# Patient Record
Sex: FEMALE | Race: BLACK OR AFRICAN AMERICAN | Employment: FULL TIME | URBAN - METROPOLITAN AREA
[De-identification: names, ages, dates, MRNs, and addresses within clinical notes are randomized per-mention and may not be internally consistent; named-entity substitution may affect disease eponyms.]

---

## 2021-04-19 ENCOUNTER — HOSPITAL ENCOUNTER (OUTPATIENT)
Age: 26
Discharge: HOME | End: 2021-04-19

## 2021-04-19 VITALS
RESPIRATION RATE: 18 BRPM | HEIGHT: 66 IN | WEIGHT: 165 LBS | OXYGEN SATURATION: 98 % | HEART RATE: 77 BPM | SYSTOLIC BLOOD PRESSURE: 136 MMHG | DIASTOLIC BLOOD PRESSURE: 58 MMHG | TEMPERATURE: 98 F | BODY MASS INDEX: 26.52 KG/M2

## 2021-04-19 DIAGNOSIS — B02.7 DISSEMINATED HERPES ZOSTER: Primary | ICD-10-CM

## 2021-04-19 PROCEDURE — 99203 OFFICE O/P NEW LOW 30 MIN: CPT | Performed by: NURSE PRACTITIONER

## 2021-04-19 RX ORDER — HYDROCODONE BITARTRATE AND ACETAMINOPHEN 5; 325 MG/1; MG/1
1-2 TABLET ORAL EVERY 6 HOURS PRN
Qty: 20 TABLET | Refills: 0 | Status: SHIPPED | OUTPATIENT
Start: 2021-04-19 | End: 2021-04-26

## 2021-04-19 RX ORDER — PREDNISONE 20 MG/1
TABLET ORAL
Qty: 21 TABLET | Refills: 0 | Status: SHIPPED | OUTPATIENT
Start: 2021-04-19 | End: 2021-05-01

## 2021-04-19 RX ORDER — VALACYCLOVIR HYDROCHLORIDE 1 G/1
1 TABLET, FILM COATED ORAL 3 TIMES DAILY
Qty: 21 TABLET | Refills: 0 | Status: SHIPPED | OUTPATIENT
Start: 2021-04-19 | End: 2021-04-26

## 2021-04-19 RX ORDER — IBUPROFEN 600 MG/1
600 TABLET ORAL EVERY 8 HOURS PRN
Qty: 30 TABLET | Refills: 0 | Status: SHIPPED | OUTPATIENT
Start: 2021-04-19 | End: 2021-04-26

## 2021-04-19 NOTE — ED PROVIDER NOTES
Patient Seen in: Immediate [address] [place] [address]      History   Patient presents with:  Rash Skin Problem: Shingles    Stated Complaint: rash on right side     HPI/Subjective:    This is a 17-year-old FTM presents to the immediate care with a rash to the HENT:      Head: Normocephalic. Cardiovascular:      Rate and Rhythm: Normal rate. Pulmonary:      Effort: Pulmonary effort is normal.   Musculoskeletal:         General: Normal range of motion. Skin:     General: Skin is warm and dry.       Capillary HYDROcodone-acetaminophen 5-325 MG Oral Tab  Take 1-2 tablets by mouth every 6 (six) hours as needed for Pain., Normal, Disp-20 tablet, R-0

## 2021-04-19 NOTE — ED INITIAL ASSESSMENT (HPI)
Pain w rash to right side of hip, waist & back started approx   3 days ago,  Minimal relief w benadryl lotion.

## (undated) NOTE — LETTER
Date & Time: 4/19/2021, 1:54 PM  Patient: Dinora Chirinos  Encounter Provider(s):    BRODERICK Armando       To Whom It May Concern:    Dinora Chirinos was seen and treated in our department on 4/19/2021.  Dinora Chirinos is not to return to work until